# Patient Record
Sex: FEMALE | Race: WHITE
[De-identification: names, ages, dates, MRNs, and addresses within clinical notes are randomized per-mention and may not be internally consistent; named-entity substitution may affect disease eponyms.]

---

## 2020-06-13 ENCOUNTER — HOSPITAL ENCOUNTER (EMERGENCY)
Dept: HOSPITAL 97 - ER | Age: 40
Discharge: HOME | End: 2020-06-13
Payer: COMMERCIAL

## 2020-06-13 VITALS — SYSTOLIC BLOOD PRESSURE: 117 MMHG | OXYGEN SATURATION: 99 % | DIASTOLIC BLOOD PRESSURE: 74 MMHG

## 2020-06-13 VITALS — TEMPERATURE: 98.1 F

## 2020-06-13 DIAGNOSIS — N83.202: Primary | ICD-10-CM

## 2020-06-13 DIAGNOSIS — K80.20: ICD-10-CM

## 2020-06-13 DIAGNOSIS — Z98.82: ICD-10-CM

## 2020-06-13 DIAGNOSIS — F90.9: ICD-10-CM

## 2020-06-13 DIAGNOSIS — D25.9: ICD-10-CM

## 2020-06-13 DIAGNOSIS — F32.9: ICD-10-CM

## 2020-06-13 LAB
ALBUMIN SERPL BCP-MCNC: 3.7 G/DL (ref 3.4–5)
ALP SERPL-CCNC: 51 U/L (ref 45–117)
ALT SERPL W P-5'-P-CCNC: 19 U/L (ref 12–78)
AST SERPL W P-5'-P-CCNC: 11 U/L (ref 15–37)
BUN BLD-MCNC: 14 MG/DL (ref 7–18)
GLUCOSE SERPLBLD-MCNC: 93 MG/DL (ref 74–106)
HCT VFR BLD CALC: 38.4 % (ref 36–45)
LIPASE SERPL-CCNC: 143 U/L (ref 73–393)
LYMPHOCYTES # SPEC AUTO: 2.8 K/UL (ref 0.7–4.9)
PMV BLD: 7.3 FL (ref 7.6–11.3)
POTASSIUM SERPL-SCNC: 3.7 MMOL/L (ref 3.5–5.1)
RBC # BLD: 4.4 M/UL (ref 3.86–4.86)

## 2020-06-13 PROCEDURE — 80048 BASIC METABOLIC PNL TOTAL CA: CPT

## 2020-06-13 PROCEDURE — 80076 HEPATIC FUNCTION PANEL: CPT

## 2020-06-13 PROCEDURE — 96374 THER/PROPH/DIAG INJ IV PUSH: CPT

## 2020-06-13 PROCEDURE — 85025 COMPLETE CBC W/AUTO DIFF WBC: CPT

## 2020-06-13 PROCEDURE — 96361 HYDRATE IV INFUSION ADD-ON: CPT

## 2020-06-13 PROCEDURE — 99284 EMERGENCY DEPT VISIT MOD MDM: CPT

## 2020-06-13 PROCEDURE — 96375 TX/PRO/DX INJ NEW DRUG ADDON: CPT

## 2020-06-13 PROCEDURE — 36415 COLL VENOUS BLD VENIPUNCTURE: CPT

## 2020-06-13 PROCEDURE — 83690 ASSAY OF LIPASE: CPT

## 2020-06-13 PROCEDURE — 74177 CT ABD & PELVIS W/CONTRAST: CPT

## 2020-06-13 PROCEDURE — 81025 URINE PREGNANCY TEST: CPT

## 2020-06-13 PROCEDURE — 81003 URINALYSIS AUTO W/O SCOPE: CPT

## 2020-06-13 NOTE — RAD REPORT
EXAM DESCRIPTION:  CT - Abdomen   Pelvis W Contrast - 6/13/2020 4:26 pm

 

CLINICAL HISTORY:  Abdominal pain

 

COMPARISON:  none.

 

TECHNIQUE:  Computed axial tomography of the abdomen pelvis was obtained. 100 cc Isovue-300 was admin
istered intravenously. Oral contrast was not requested which limits evaluation of bowel.

 

All CT scans are performed using dose optimization technique as appropriate and may include automated
 exposure control or mA/KV adjustment according to patient size.

 

FINDINGS:  Cholelithiasis. Gallbladder wall does not appear thickened.

 

Liver, spleen, pancreas, adrenals and kidneys unremarkable

 

There is no evidence of diverticulitis. Normal appendix

 

Fibroid uterus

 

4 centimeter complex cystic left adnexal. It contains increased density posteriorly. No significant f
ree fluid

 

IMPRESSION:  4 centimeter complex cystic left adnexal mass probably a hemorrhagic cyst. Follow up ult
rasound in 6 weeks recommended for re-evaluation

 

Fibroid uterus

 

Cholelithiasis without evidence cholecystitis.

## 2020-06-13 NOTE — ER
Nurse's Notes                                                                                     

 [facility]                                                                 

Name: Radha Del Rio                                                                                

Age: 39 yrs                                                                                       

Sex: Female                                                                                       

: 1980                                                                                   

MRN: [id number]                                                                                   

Arrival Date: 2020                                                                          

Time: 14:58                                                                                       

Account#: [id number]                                                                            

Bed 20                                                                                            

Private MD:                                                                                       

Diagnosis: Pelvic and perineal pain;Unspecified ovarian cysts-left hemorrhagic 4 cm;Leiomyoma of  

  uterus;Cholelithiasis                                                                           

                                                                                                  

Presentation:                                                                                     

                                                                                             

15:07 Chief complaint: Patient states: L pelvic area pain since yesterday. Non-radiating,     ca1 

      tender to touch and worse with movement. Denies fever. Denies urinary symptoms. Denies      

      N/V/Diarrhea. Coronavirus screen: Proceed with normal triage. Patient denies a cough.       

      Patient denies shortness of breath or difficulty breathing. Patient denies measured         

      and/or subjective temperature greater than 100.4F prior to today's visit. Patient           

      denies travel on a cruise ship or to a country the [facility] [place] lists as an affected       

      area. Patient denies contact with known and/or suspected case of COVID-19. Ebola            

      Screen: Patient negative for fever greater than or equal to 101.5 degrees Fahrenheit,       

      and additional compatible Ebola Virus Disease symptoms Patient denies exposure to           

      infectious person. Patient denies travel to an Ebola-affected area in the 21 days           

      before illness onset. No symptoms or risks identified at this time. Initial Sepsis          

      Screen: Does the patient meet any 2 criteria? No. Patient's initial sepsis screen is        

      negative. Does the patient have a suspected source of infection? No. Patient's initial      

      sepsis screen is negative. Risk Assessment: Do you want to hurt yourself or someone         

      else? Patient reports no desire to harm self or others. Onset of symptoms was 2020.                                                                                       

15:07 Method Of Arrival: Ambulatory                                                           ca1 

15:07 Acuity: MARY 3                                                                           ca1 

                                                                                                  

Triage Assessment:                                                                                

15:10 General: Appears in no apparent distress. comfortable, Behavior is calm, cooperative,   ca1 

      appropriate for age. Pain: Complains of pain in left lower quadrant Pain does not           

      radiate. Pain currently is 4 out of 10 on a pain scale. Pain began 1 day ago. Is            

      intermittent, Aggravated by repositioning. EENT: No signs and/or symptoms were reported     

      regarding the EENT system. Neuro: Level of Consciousness is awake, alert, obeys             

      commands, Oriented to person, place, time, situation, Appropriate for age.                  

      Cardiovascular: Heart tones S1 S2 present Capillary refill < 3 seconds Patient's skin       

      is warm and dry. Respiratory: Airway is patent Respiratory effort is even, unlabored,       

      Respiratory pattern is regular, symmetrical, Breath sounds are clear bilaterally. GI:       

      Abdomen is flat, non-distended, Bowel sounds present X 4 quads. Abd is soft X 4 quads       

      Abdomen is tender to palpation in left lower quadrant. : No signs and/or symptoms         

      were reported regarding the genitourinary system. Derm: Skin is intact, is healthy with     

      good turgor, Skin is pink, warm \T\ dry. Derm: Musculoskeletal: Circulation, motion, and    

      sensation intact. Capillary refill < 3 seconds.                                             

                                                                                                  

OB/GYN:                                                                                           

15:10 LMP 2020                                                                           ca1 

                                                                                                  

Historical:                                                                                       

- Allergies:                                                                                      

15:10 No Known Allergies;                                                                     ca1 

- Home Meds:                                                                                      

15:10 Effexor XR Oral [Active];                                                               ca1 

- PMHx:                                                                                           

15:10 ADD/ADHD; Depression;                                                                   ca1 

- PSHx:                                                                                           

15:10 ; Hernia repair; TUMMY TUCK; BREAST AUGMENTATION;                              ca1 

                                                                                                  

- Immunization history:: Adult Immunizations up to date.                                          

- Social history:: Smoking status: Patient denies any tobacco usage or history of.                

- Family history:: not pertinent.                                                                 

                                                                                                  

                                                                                                  

Screening:                                                                                        

15:12 Abuse screen: Denies threats or abuse. Denies injuries from another. Nutritional        ca1 

      screening: No deficits noted. Tuberculosis screening: No symptoms or risk factors           

      identified. Fall Risk None identified.                                                      

                                                                                                  

Assessment:                                                                                       

15:12 Reassessment: See triage notes.                                                         ca1 

16:32 Reassessment: Patient appears in no apparent distress at this time. Patient and/or      ca1 

      family updated on plan of care and expected duration. Pain level reassessed. Patient is     

      alert, oriented x 3, equal unlabored respirations, skin warm/dry/pink.                      

                                                                                                  

Vital Signs:                                                                                      

15:07  / 98; Pulse 107; Resp 16 S; Temp 98.1(O); Pulse Ox 100% on R/A; Weight 73.94 kg  ca1 

      (R); Height 5 ft. 2 in. (157.48 cm) (R); Pain 4/10;                                         

16:32  / 74; Pulse 83; Resp 15 S; Pulse Ox 99% on R/A;                                  ca1 

15:07 Body Mass Index 29.81 (73.94 kg, 157.48 cm)                                             ca1 

                                                                                                  

ED Course:                                                                                        

14:58 Patient arrived in ED.                                                                  [email or web address] 

14:58 Rahul Kilpatrick MD is Attending Physician.                                             vanessa 

14:59 Julieta Bull, RN is Primary Nurse.                                                      ca1 

15:09 Triage completed.                                                                       ca1 

15:10 Arm band placed on right wrist.                                                         ca1 

15:12 Patient has correct armband on for positive identification. Placed in gown. Bed in low  ca1 

      position. Call light in reach. Side rails up X 1. Pulse ox on. NIBP on. Warm blanket        

      given.                                                                                      

15:18 No provider procedures requiring assistance completed. Initial lab(s) drawn, by me,     ca1 

      sent to lab. Inserted saline lock: 20 gauge in right antecubital area, using aseptic        

      technique. Blood collected.                                                                 

16:27 CT Abd/Pelvis - IV Contrast Only In Process Unspecified.                                EDMS

16:47 Andie Cruz MD is Referral Physician.                                            vanessa 

17:06 IV discontinued, intact, bleeding controlled, No redness/swelling at site. Pressure     ca1 

      dressing applied.                                                                           

                                                                                                  

Administered Medications:                                                                         

15:31 Drug: NS 0.9% 1000 ml Route: IV; Rate: 1 bolus; Site: right antecubital;                ca1 

16:30 Follow up: IV Status: Completed infusion                                                ca1 

15:32 Drug: Zofran (Ondansetron) 4 mg Route: IVP; Site: right antecubital;                    ca1 

16:30 Follow up: Response: No adverse reaction; Pain is decreased                             ca1 

15:34 Drug: TORadol 30 mg Route: IVP; Site: right antecubital;                                ca1 

16:30 Follow up: Response: No adverse reaction; Pain is decreased                             ca1 

15:36 Drug: morphine 2 mg {Note: rass 0.} Route: IVP; Site: right antecubital;                ca1 

16:20 Drug: morphine 2 mg {Note: rass 0.} Route: IVP; Site: right antecubital;                ca1 

17:00 Follow up: Response: No adverse reaction; Pain is decreased; RASS: Alert and Calm (0)   ca1 

                                                                                                  

                                                                                                  

Outcome:                                                                                          

16:49 Discharge ordered by MD.                                                                vanessa 

17:06 Discharged to home ambulatory, with family.                                             ca1 

17:06 Condition: stable                                                                           

17:06 Discharge instructions given to patient, Instructed on discharge instructions, follow       

      up and referral plans. no drinking with medication, no driving heavy equipment,             

      medication usage, Demonstrated understanding of instructions, follow-up care,               

      medications, Prescriptions given X 2.                                                       

17:29 Patient left the ED.                                                                    ca1 

                                                                                                  

Signatures:                                                                                       

Dispatcher [employer]                           EDRahul Hanna MD MD cha Acob, Cheryl, RN                        RN   Giuliana Gutierrez                                [id number]                                                  

                                                                                                  

**************************************************************************************************

## 2020-06-13 NOTE — EDPHYS
Physician Documentation                                                                           

 [facility]                                                                 

Name: Radha Del Rio                                                                                

Age: 39 yrs                                                                                       

Sex: Female                                                                                       

: 1980                                                                                   

MRN: [id number]                                                                                   

Arrival Date: 2020                                                                          

Time: 14:58                                                                                       

Account#: [id number]                                                                            

Bed 20                                                                                            

Private MD:                                                                                       

[facility] Physician Rahul Kilpatrick                                                                      

HPI:                                                                                              

                                                                                             

15:24 This 39 yrs old  Female presents to ER via Ambulatory with complaints of       vanessa 

      Pelvic Pain.                                                                                

15:24 The patient presents with abdominal pain in the left lower quadrant. Onset: The         vanessa 

      symptoms/episode began/occurred 3 day(s) ago. The symptoms do not radiate. Associated       

      signs and symptoms: none. The symptoms are described as constant, crampy. Modifying         

      factors: The symptoms are alleviated by nothing, the symptoms are aggravated by             

      nothing. Severity of pain: At its worst the pain was mild in the emergency department       

      the pain is unchanged. The patient has not experienced similar symptoms in the past.        

                                                                                                  

OB/GYN:                                                                                           

15:10 LMP 2020                                                                           ca1 

                                                                                                  

Historical:                                                                                       

- Allergies:                                                                                      

15:10 No Known Allergies;                                                                     ca1 

- Home Meds:                                                                                      

15:10 Effexor XR Oral [Active];                                                               ca1 

- PMHx:                                                                                           

15:10 ADD/ADHD; Depression;                                                                   ca1 

- PSHx:                                                                                           

15:10 ; Hernia repair; TUMMY TUCK; BREAST AUGMENTATION;                              ca1 

                                                                                                  

- Immunization history:: Adult Immunizations up to date.                                          

- Social history:: Smoking status: Patient denies any tobacco usage or history of.                

- Family history:: not pertinent.                                                                 

                                                                                                  

                                                                                                  

ROS:                                                                                              

15:24 Constitutional: Negative for fever, chills, and weight loss, Eyes: Negative for injury, vanessa 

      pain, redness, and discharge, ENT: Negative for injury, pain, and discharge, Neck:          

      Negative for injury, pain, and swelling, Cardiovascular: Negative for chest pain,           

      palpitations, and edema, Respiratory: Negative for shortness of breath, cough,              

      wheezing, and pleuritic chest pain, Back: Negative for injury and pain, : Negative        

      for injury, bleeding, discharge, and swelling, MS/Extremity: Negative for injury and        

      deformity, Skin: Negative for injury, rash, and discoloration, Neuro: Negative for          

      headache, weakness, numbness, tingling, and seizure, Psych: Negative for depression,        

      anxiety, suicide ideation, homicidal ideation, and hallucinations, Allergy/Immunology:      

      Negative for hives, rash, and allergies, Endocrine: Negative for neck swelling,             

      polydipsia, polyuria, polyphagia, and marked weight changes, Hematologic/Lymphatic:         

      Negative for swollen nodes, abnormal bleeding, and unusual bruising.                        

15:24 Abdomen/GI: Positive for abdominal pain, of the left lower quadrant.                        

                                                                                                  

Exam:                                                                                             

15:24 Constitutional:  This is a well developed, well nourished patient who is awake, alert,  vansesa 

      and in no acute distress. Head/Face:  Normocephalic, atraumatic. Eyes:  Pupils equal        

      round and reactive to light, extra-ocular motions intact.  Lids and lashes normal.          

      Conjunctiva and sclera are non-icteric and not injected.  Cornea within normal limits.      

      Periorbital areas with no swelling, redness, or edema. ENT:  Nares patent. No nasal         

      discharge, no septal abnormalities noted.  Tympanic membranes are normal and external       

      auditory canals are clear.  Oropharynx with no redness, swelling, or masses, exudates,      

      or evidence of obstruction, uvula midline.  Mucous membranes moist. Neck:  Trachea          

      midline, no thyromegaly or masses palpated, and no cervical lymphadenopathy.  Supple,       

      full range of motion without nuchal rigidity, or vertebral point tenderness.  No            

      Meningismus. Chest/axilla:  Normal chest wall appearance and motion.  Nontender with no     

      deformity.  No lesions are appreciated. Cardiovascular:  Regular rate and rhythm with a     

      normal S1 and S2.  No gallops, murmurs, or rubs.  Normal PMI, no JVD.  No pulse             

      deficits. Respiratory:  Lungs have equal breath sounds bilaterally, clear to                

      auscultation and percussion.  No rales, rhonchi or wheezes noted.  No increased work of     

      breathing, no retractions or nasal flaring. Back:  No spinal tenderness.  No                

      costovertebral tenderness.  Full range of motion. Skin:  Warm, dry with normal turgor.      

      Normal color with no rashes, no lesions, and no evidence of cellulitis. MS/ Extremity:      

      Pulses equal, no cyanosis.  Neurovascular intact.  Full, normal range of motion. Neuro:     

       Awake and alert, GCS 15, oriented to person, place, time, and situation.  Cranial          

      nerves II-XII grossly intact.  Motor strength 5/5 in all extremities.  Sensory grossly      

      intact.  Cerebellar exam normal.  Normal gait. Psych:  Awake, alert, with orientation       

      to person, place and time.  Behavior, mood, and affect are within normal limits.            

15:24 Abdomen/GI: Inspection: abdomen appears normal, Bowel sounds: normal, Palpation: mild       

      abdominal tenderness, moderate abdominal tenderness, in the left lower quadrant, Liver:     

      no appreciated palpable abnormalities, Hernia: not appreciated.                             

                                                                                                  

Vital Signs:                                                                                      

15:07  / 98; Pulse 107; Resp 16 S; Temp 98.1(O); Pulse Ox 100% on R/A; Weight 73.94 kg  ca1 

      (R); Height 5 ft. 2 in. (157.48 cm) (R); Pain 4/10;                                         

16:32  / 74; Pulse 83; Resp 15 S; Pulse Ox 99% on R/A;                                  ca1 

15:07 Body Mass Index 29.81 (73.94 kg, 157.48 cm)                                             ca1 

                                                                                                  

MDM:                                                                                              

14:59 Patient medically screened.                                                             [facility] 

15:33 Data reviewed: vital signs, nurses notes, lab test result(s), radiologic studies, CT    vanessa 

      scan, plain films.                                                                          

15:33 Differential diagnosis: diverticulitis, Irritable bowel syndrome, non-specific abd      vanessa 

      pain, pancreatitis, Peptic Ulcer Disease, Pyelonephritis, urinary tract infection. Data     

      interpreted: Cardiac monitor: rate is 107 beats/min, rhythm is normal sinus rhythm,         

      Pulse oximetry: on room air is 100 %. Counseling: I had a detailed discussion with the      

      patient and/or guardian regarding: the historical points, exam findings, and any            

      diagnostic results supporting the discharge/admit diagnosis, lab results, radiology         

      results.                                                                                    

16:45 ED course: ct report, left 4cm cyst ovarian, suspect hemorrhagic, will treat with pain  vanessa 

      meds and follow up gyn. pt to return to if worsens or persist.                              

16:51 ED course: pt understands the plan.                                                     vanessa 

                                                                                                  

                                                                                             

15:18 Order name: Basic Metabolic Panel; Complete Time: 16:42                                 ca1 

                                                                                             

15:18 Order name: CBC with Diff; Complete Time: 15:49                                         ca1 

                                                                                             

15:18 Order name: Hepatic Function; Complete Time: 16:42                                      ca1 

                                                                                             

15:18 Order name: Lipase; Complete Time: 16:42                                                ca1 

                                                                                             

15:18 Order name: Urine Dipstick--Ancillary (enter results)                                   eb  

                                                                                             

15:18 Order name: Urine Pregnancy--Ancillary (enter results)                                  eb  

                                                                                             

15:18 Order name: IV Saline Lock; Complete Time: 15:18                                        ca1 

                                                                                             

15:18 Order name: Labs collected and sent; Complete Time: 15:18                               ca1 

                                                                                             

15:52 Order name: CT Abd/Pelvis - IV Contrast Only; Complete Time: 16:42                      vanessa 

                                                                                                  

Administered Medications:                                                                         

15:31 Drug: NS 0.9% 1000 ml Route: IV; Rate: 1 bolus; Site: right antecubital;                ca1 

16:30 Follow up: IV Status: Completed infusion                                                ca1 

15:32 Drug: Zofran (Ondansetron) 4 mg Route: IVP; Site: right antecubital;                    ca1 

16:30 Follow up: Response: No adverse reaction; Pain is decreased                             ca1 

15:34 Drug: TORadol 30 mg Route: IVP; Site: right antecubital;                                ca1 

16:30 Follow up: Response: No adverse reaction; Pain is decreased                             ca1 

15:36 Drug: morphine 2 mg {Note: rass 0.} Route: IVP; Site: right antecubital;                ca1 

16:20 Drug: morphine 2 mg {Note: rass 0.} Route: IVP; Site: right antecubital;                ca1 

17:00 Follow up: Response: No adverse reaction; Pain is decreased; RASS: Alert and Calm (0)   ca1 

                                                                                                  

                                                                                                  

Disposition:                                                                                      

20 16:49 Discharged to Home. Impression: Pelvic and perineal pain, Unspecified ovarian      

  cysts - left hemorrhagic 4 cm, Leiomyoma of uterus, Cholelithiasis.                             

- Condition is Stable.                                                                            

- Discharge Instructions: Abdominal Pain, Adult, Ovarian Cyst, Pelvic Pain, Female,               

  Cholelithiasis, Pelvic Pain, Female, Easy-to-Read, Ovarian Cyst, Easy-to-Read.                  

- Prescriptions for Tylenol- Codeine #3 300-30 mg Oral Tablet - take 2 tablets by ORAL            

  route every 6 hours As needed; 24 tablet. Motrin  mg Oral Tablet - take 2                 

  tablet by ORAL route every 6 hours As needed as needed with food; 30 tablet.                    

- Medication Reconciliation Form, Thank You Letter, Antibiotic Education, Prescription            

  Opioid Use form.                                                                                

- Follow up: Private Physician; When: 2 - 3 days; Reason: Recheck today's complaints,             

  Continuance of care, Re-evaluation by your physician. Follow up: Andie Cruz MD; When: 2 - 3 days; Reason: Recheck today's complaints, Re-evaluation by your                 

  physician.                                                                                      

- Problem is new.                                                                                 

- Symptoms have improved.                                                                         

                                                                                                  

                                                                                                  

                                                                                                  

Signatures:                                                                                       

Dispatcher [employer]                           Rahul Miranda MD MD cha Acob, Cheryl RN                        RN   ca1                                                  

                                                                                                  

Corrections: (The following items were deleted from the chart)                                    

17:29 16:49 2020 16:49 Discharged to Home. Impression: Pelvic and perineal pain;        ca1 

      Unspecified ovarian cysts - left hemorrhagic 4 cm; Leiomyoma of uterus; Cholelithiasis.     

      Condition is Stable. Forms are Medication Reconciliation Form, Thank You Letter,            

      Antibiotic Education, Prescription Opioid Use. Follow up: Private Physician; When: 2 -      

      3 days; Reason: Recheck today's complaints, Continuance of care, Re-evaluation by your      

      physician. Follow up: Andie Cruz; When: 2 - 3 days; Reason: Recheck today's           

      complaints, Re-evaluation by your physician. Problem is new. Symptoms have improved. vanessa    

                                                                                                  

**************************************************************************************************

## 2020-09-29 LAB
HCT VFR BLD CALC: 40.1 % (ref 36–45)
LYMPHOCYTES # SPEC AUTO: 2.9 K/UL (ref 0.7–4.9)
PMV BLD: 7.2 FL (ref 7.6–11.3)
RBC # BLD: 4.58 M/UL (ref 3.86–4.86)
UA DIPSTICK W REFLEX MICRO PNL UR: (no result)

## 2020-10-06 ENCOUNTER — HOSPITAL ENCOUNTER (OUTPATIENT)
Dept: HOSPITAL 97 - OR | Age: 40
Discharge: HOME | End: 2020-10-06
Attending: OBSTETRICS & GYNECOLOGY
Payer: COMMERCIAL

## 2020-10-06 VITALS — TEMPERATURE: 97.4 F

## 2020-10-06 VITALS — OXYGEN SATURATION: 100 %

## 2020-10-06 VITALS — SYSTOLIC BLOOD PRESSURE: 10 MMHG | DIASTOLIC BLOOD PRESSURE: 58 MMHG

## 2020-10-06 DIAGNOSIS — N95.1: ICD-10-CM

## 2020-10-06 DIAGNOSIS — N83.202: ICD-10-CM

## 2020-10-06 DIAGNOSIS — N93.9: ICD-10-CM

## 2020-10-06 DIAGNOSIS — N94.6: Primary | ICD-10-CM

## 2020-10-06 DIAGNOSIS — R10.32: ICD-10-CM

## 2020-10-06 DIAGNOSIS — I10: ICD-10-CM

## 2020-10-06 DIAGNOSIS — Z20.828: ICD-10-CM

## 2020-10-06 PROCEDURE — 58563 HYSTEROSCOPY ABLATION: CPT

## 2020-10-06 PROCEDURE — 85025 COMPLETE CBC W/AUTO DIFF WBC: CPT

## 2020-10-06 PROCEDURE — 86900 BLOOD TYPING SEROLOGIC ABO: CPT

## 2020-10-06 PROCEDURE — 0U5B8ZZ DESTRUCTION OF ENDOMETRIUM, VIA NATURAL OR ARTIFICIAL OPENING ENDOSCOPIC: ICD-10-PCS

## 2020-10-06 PROCEDURE — 0UT14ZZ RESECTION OF LEFT OVARY, PERCUTANEOUS ENDOSCOPIC APPROACH: ICD-10-PCS

## 2020-10-06 PROCEDURE — 86901 BLOOD TYPING SEROLOGIC RH(D): CPT

## 2020-10-06 PROCEDURE — 58661 LAPAROSCOPY REMOVE ADNEXA: CPT

## 2020-10-06 PROCEDURE — 81003 URINALYSIS AUTO W/O SCOPE: CPT

## 2020-10-06 PROCEDURE — 86850 RBC ANTIBODY SCREEN: CPT

## 2020-10-06 PROCEDURE — 81025 URINE PREGNANCY TEST: CPT

## 2020-10-06 PROCEDURE — 36415 COLL VENOUS BLD VENIPUNCTURE: CPT

## 2020-10-06 PROCEDURE — 0UT74ZZ RESECTION OF BILATERAL FALLOPIAN TUBES, PERCUTANEOUS ENDOSCOPIC APPROACH: ICD-10-PCS

## 2020-10-06 PROCEDURE — 88305 TISSUE EXAM BY PATHOLOGIST: CPT

## 2020-10-06 RX ADMIN — HYDROMORPHONE HYDROCHLORIDE ONE MG: 1 INJECTION, SOLUTION INTRAMUSCULAR; INTRAVENOUS; SUBCUTANEOUS at 10:57

## 2020-10-06 RX ADMIN — BUPIVACAINE HYDROCHLORIDE ONE ML: 2.5 INJECTION, SOLUTION EPIDURAL; INFILTRATION; INTRACAUDAL at 09:16

## 2020-10-06 RX ADMIN — HYDROMORPHONE HYDROCHLORIDE ONE MG: 1 INJECTION, SOLUTION INTRAMUSCULAR; INTRAVENOUS; SUBCUTANEOUS at 11:07

## 2020-10-06 RX ADMIN — BUPIVACAINE HYDROCHLORIDE ONE ML: 2.5 INJECTION, SOLUTION EPIDURAL; INFILTRATION; INTRACAUDAL at 09:45

## 2020-10-06 NOTE — OP
Date of Procedure:  10/06/2020



Surgeon:  Andie Cruz MD



Assistant:  Gretchen Johnson.



Preoperative Diagnosis:  AUB-L/A left lower quadrant pain, dysmenorrhea.



Postoperative Diagnosis:  AUB-L/A left lower quadrant pain, dysmenorrhea, prior history of umbilical 
hernia repair and difficult entry, omental adhesions.



Procedures Performed:  

1.Diagnostic hysteroscopy, endometrial ablation with NovaSure.  

2.Diagnostic laparoscopy, lysis of adhesions, bilateral salpingectomy, left oophorectomy.



Complications:  No complications.



Drains:  None.



Anesthesia:  General endotracheal.



Estimated Blood Loss:  Minimal.



Condition:  Stable.



Findings:  No evidence of any endometriosis seen on pelvic examination.  Adhesions of the uterus to t
he anterior abdominal wall.  The umbilical hernia repair side without any adhesions.  Left upper quad
rant entry with direct laparoscopy was done. 



Endometrial cavity was empty, excellent ablation effect.  Sounding length was 11 cm, cervical length 
6, cavity length calculated was 5 cm, width 4.8 cm, power of 132 estrella.  T is 1 minute and 42 seconds
.  Excellent ablation effect was visualized after the performance of the ablation.  Then cavity was v
isualized using hysteroscopy. 



Appendix normal.  Small bowel adhesions to the right lower quadrant, however, not attached to the ova
ry or the tube.  Omental adhesions of anterior abdominal wall. 



The patient is a 40-year-old female with heavy periods and dysmenorrhea, also complaining of intermit
tent left lower quadrant pain.  At this time, this has subsided.  She has had 2 C-sections and a tuba
l, abdominoplasty, umbilical hernia repair. 



After she had endometrial sampling and there was no intracavitary atypia or malignancy.  She was cj
enopausal.  She was given the options of endometrial ablation with NovaSure another device and laparo
scopy to diagnose and treat her pain.  If this fails, she understood that she would need a hysterecto
my.  As this was less invasive both the options, the patient preferred to start with this.



Description Of Procedure:  After informed consent was verified, she was taken back to OR, 2 g of Ance
f were given.  She was placed in supine fashion on the operating table.  General anesthesia was given
.  Placed in a dorsal lithotomy position.  Abdomen, vulva, vagina, and perineum were prepped and drap
ed in a sterile fashion.  Pettit was placed to drain the bladder.  Speculum was placed to expose the c
ervix.  Anterior lip was grasped with Allis clamps.  Diagnostic SlimLine hysteroscope was used to per
form the direct hysteroscopy into the uterine cavity.  The cavity sounding length with direct measure
ment was 11 cm and cervical length was 6 cm.  So, calculated cavity length of 5 cm, was entered into 
the NovaSure generator. 

The ablation device was then taken and inserted through the cervical canal into the uterine cavity wi
thout any problems.  Once reached the fundus, the fan was deployed.  Width was calculated to be 4.8 c
m.  This was entered into the generator.  Once the cervical os was occluded, cavity integrity test wa
s done and this was passed right away.  Ablation was then enabled and the cycle started for complete 
time of 1 minute and 42 seconds.  Device was slowly undeployed and removed.  Diagnostic hysteroscopy 
was performed.  There was excellent ablation with global defect.  This was removed and diagnostic VCa
re was introduced and this area was draped. 



1 cm left upper quadrant port incision was made in a transverse fashion.  This was because of her his
tory of abdominoplasty,  and umbilical hernia, which was repaired most likely with mesh.  So
, the left upper quadrant entry was counseled to the patient and that was used.  Local Marcaine was i
njected before making an incision.  Fascia was exposed, incised with a knife, and tagged with 0 Vicry
l sutures.  Then, rectus muscles were .  Rectus fascia was picked up and incised as well, ta
gged with 0 Vicryl sutures.  Again, this level, peritoneum entered bluntly.  S retractors were placed
.  Jayson was introduced with insufflation of the abdominal cavity, made for good visualization of th
e entire abdominal cavity.  Upper abdominal surface was completely unremarkable.  Omental adhesions w
ere present.  A 5 left lower quadrant port was placed and the adhesions were taken down with the help
 of the LigaSure.  Then, there were adhesions of the uterus to the anterior abdominal wall.  These we
re not necessary to be taken down as they did not seem to be contributed to her problem. 



5 right lower quadrant port was also placed under direct vision without any problems.  Then, after mitchell
rveying the pelvic cavity in a very thorough fashion, left lateral wall, posterior cul-de-sac, the an
terior and posterior broad ligament, ovaries and tubes, there was no evidence of any endometriosis.  
A very slight amount of dark tissue present at the interruption of the right tube on the distal end. 
 So, there was no cyst on the ovary.  However, the ovary appeared to be slightly enlarged.  Patient w
as counseled if there is no other etiology for her left lower quadrant pain that I would remove the l
eft ovary for which she consented for.  The infundibulopelvic ligament was taken with the help of the
 LigaSure and through the mesosalpinx, the dissection was performed to separate the tube, then along 
the utero-ovarian ligament posterior and towards the uterus.  Dissection was carried to the get this 
to hang down freely.  Then, the proximal part of the tube and the ovary were all dissected and cut.  
This was placed in the posterior cul-de-sac.  Then, the right tube was taken down with the LigaSure. 
 Right tubal specimens were removed through the right lower quadrant port directly without a bag and 
the left ovary and tube were placed in the bag and removed through the left upper quadrant port.  All
 the trocars were removed.  No bleeding at the entry and exit.  Marcaine was injected at the transver
jose antonio fascia level and then at the skin.  The left upper quadrant port was removed.  The deeper fascial
 level was closed separately with 2 tag sutures tied to each other and then on the top fascial layer,
 rectus sheath was closed with the help of the tagged sutures tied to each other.  There was excellen
t closure.  No evidence of any hernia.  The subcutaneous tissue was brought together with simple 2-0 
Vicryl stitch and skin incisions with 4-0 Vicryl closure interrupted.  VCare and Pettit were removed. 
 Instrument, needle, sponge counts were correct at the end of case.  The patient tolerated the proced
ure well.  She was recovered from anesthesia and taken to PACU in stable condition.  There was a good
 amount of irrigation that was done before the closure. 



She has 1 week postop appointment with me.  She will be discharged home today with Motrin and Norco w
ill be called into [place].  Regular diet, ambulation, out of work for a week, and she can call 
the office for any problems and the problem __________ that were placed in the patient's chart.  Find
ings were discussed with the partner.





SK/MARIA GUADALUPE

DD:  10/06/2020 10:36:15Voice ID:  [id number]

DT:  10/06/2020 11:45:53Report ID:  [id number]